# Patient Record
Sex: FEMALE | Race: WHITE | NOT HISPANIC OR LATINO | Employment: OTHER | URBAN - METROPOLITAN AREA
[De-identification: names, ages, dates, MRNs, and addresses within clinical notes are randomized per-mention and may not be internally consistent; named-entity substitution may affect disease eponyms.]

---

## 2017-09-29 ENCOUNTER — OFFICE VISIT (OUTPATIENT)
Dept: ENDOCRINOLOGY | Facility: CLINIC | Age: 37
End: 2017-09-29
Payer: COMMERCIAL

## 2017-09-29 VITALS
BODY MASS INDEX: 21.15 KG/M2 | SYSTOLIC BLOOD PRESSURE: 106 MMHG | WEIGHT: 131.6 LBS | HEIGHT: 66 IN | DIASTOLIC BLOOD PRESSURE: 70 MMHG | HEART RATE: 70 BPM

## 2017-09-29 DIAGNOSIS — D49.7 PITUITARY TUMOR: ICD-10-CM

## 2017-09-29 DIAGNOSIS — N64.3 GALACTORRHEA, BILATERAL: ICD-10-CM

## 2017-09-29 DIAGNOSIS — D35.2 PROLACTINOMA (H): Primary | ICD-10-CM

## 2017-09-29 LAB
ALBUMIN SERPL-MCNC: 4.2 G/DL (ref 3.4–5)
ALP SERPL-CCNC: 54 U/L (ref 40–150)
ALT SERPL W P-5'-P-CCNC: 21 U/L (ref 0–50)
ANION GAP SERPL CALCULATED.3IONS-SCNC: 5 MMOL/L (ref 3–14)
AST SERPL W P-5'-P-CCNC: 14 U/L (ref 0–45)
BILIRUB SERPL-MCNC: 1.3 MG/DL (ref 0.2–1.3)
BUN SERPL-MCNC: 9 MG/DL (ref 7–30)
CALCIUM SERPL-MCNC: 8.9 MG/DL (ref 8.5–10.1)
CHLORIDE SERPL-SCNC: 102 MMOL/L (ref 94–109)
CO2 SERPL-SCNC: 30 MMOL/L (ref 20–32)
CORTIS SERPL-MCNC: 6.2 UG/DL (ref 4–22)
CREAT SERPL-MCNC: 0.6 MG/DL (ref 0.52–1.04)
FSH SERPL-ACNC: 3 IU/L
GFR SERPL CREATININE-BSD FRML MDRD: >90 ML/MIN/1.7M2
GLUCOSE SERPL-MCNC: 94 MG/DL (ref 70–99)
LH SERPL-ACNC: 6.3 IU/L
POTASSIUM SERPL-SCNC: 4.2 MMOL/L (ref 3.4–5.3)
PROLACTIN SERPL-MCNC: 41 UG/L (ref 3–27)
PROT SERPL-MCNC: 7.6 G/DL (ref 6.8–8.8)
SODIUM SERPL-SCNC: 137 MMOL/L (ref 133–144)
T4 FREE SERPL-MCNC: 0.95 NG/DL (ref 0.76–1.46)
TSH SERPL DL<=0.005 MIU/L-ACNC: 1.05 MU/L (ref 0.4–4)

## 2017-09-29 PROCEDURE — 99205 OFFICE O/P NEW HI 60 MIN: CPT | Performed by: INTERNAL MEDICINE

## 2017-09-29 PROCEDURE — 82024 ASSAY OF ACTH: CPT | Performed by: INTERNAL MEDICINE

## 2017-09-29 PROCEDURE — 83002 ASSAY OF GONADOTROPIN (LH): CPT | Performed by: INTERNAL MEDICINE

## 2017-09-29 PROCEDURE — 83001 ASSAY OF GONADOTROPIN (FSH): CPT | Performed by: INTERNAL MEDICINE

## 2017-09-29 PROCEDURE — 84439 ASSAY OF FREE THYROXINE: CPT | Performed by: INTERNAL MEDICINE

## 2017-09-29 PROCEDURE — 84443 ASSAY THYROID STIM HORMONE: CPT | Performed by: INTERNAL MEDICINE

## 2017-09-29 PROCEDURE — 80053 COMPREHEN METABOLIC PANEL: CPT | Performed by: INTERNAL MEDICINE

## 2017-09-29 PROCEDURE — 82533 TOTAL CORTISOL: CPT | Performed by: INTERNAL MEDICINE

## 2017-09-29 PROCEDURE — 84305 ASSAY OF SOMATOMEDIN: CPT | Performed by: INTERNAL MEDICINE

## 2017-09-29 PROCEDURE — 36415 COLL VENOUS BLD VENIPUNCTURE: CPT | Performed by: INTERNAL MEDICINE

## 2017-09-29 PROCEDURE — 84146 ASSAY OF PROLACTIN: CPT | Performed by: INTERNAL MEDICINE

## 2017-09-29 NOTE — MR AVS SNAPSHOT
After Visit Summary   9/29/2017    Aleta Shankar    MRN: [id number]           Patient Information     Date Of Birth          1980        Visit Information        Provider Department      9/29/2017 11:00 AM Megan Lopez MD [facility]        Today's Diagnoses     Prolactinoma (H)    -  1    Pituitary tumor          Care Instructions    Please allow 7-10 business days for your lab results. You will be notified by phone, letter, or My Chart after the provider has reviewed them.  Thank you.     Please call [phone] to schedule MRI.              Follow-ups after your visit        Follow-up notes from your care team     Return in about 6 months (around 3/29/2018).      Your next 10 appointments already scheduled     Mar 23, 2018  4:00 PM CDT   Return Visit with Megan Lopez MD   [facility] ([facility])    [id number] [place] [id number] [phone]              Who to contact     If you have questions or need follow up information about today's clinic visit or your schedule please contact [facility] directly at [phone].  Normal or non-critical lab and imaging results will be communicated to you by MyChart, letter or phone within 4 business days after the clinic has received the results. If you do not hear from us within 7 days, please contact the clinic through MyChart or phone. If you have a critical or abnormal lab result, we will notify you by phone as soon as possible.  Submit refill requests through Rebyoo or call your pharmacy and they will forward the refill request to us. Please allow 3 business days for your refill to be completed.          Additional Information About Your Visit        Universal [employer] Information     Rebyoo is an electronic gateway that provides easy, online access to your medical records. With Rebyoo, you can request a clinic appointment, read your test results, renew a prescription  "or communicate with your care team.     To sign up for RealMatcht visit the website at [email or web address]   You will be asked to enter the access code listed below, as well as some personal information. Please follow the directions to create your username and password.     Your access code is: [email or web address]  Expires: 2017 11:52 AM     Your access code will  in 90 days. If you need help or a new code, please contact your [facility] or call [phone] for assistance.        Care EveryWhere ID     This is your Care EveryWhere ID. This could be used by other organizations to access your [place] medical records  [id number]        Your Vitals Were     Pulse Height BMI (Body Mass Index)             70 1.664 m (5' 5.5\") 21.57 kg/m2          Blood Pressure from Last 3 Encounters:   17 106/70    Weight from Last 3 Encounters:   17 59.7 kg (131 lb 9.6 oz)              Today, you had the following     No orders found for display       Primary Care Provider Office Phone # Fax #    [facility] [phone]       [address] [address]        Equal Access to Services     [facility]: Hadii aad ku hadasho Soomaali, waaxda luqadaha, qaybta kaalmada adeegyada, waxay idiin hayaan adeeg kharahamida hampton . So [facility] [phone].    ATENCIÓN: Si habla español, tiene a quintana disposición servicios gratuitos de asistencia lingüística. Llame al [phone].    We comply with applicable federal civil rights laws and [place] laws. We do not discriminate on the basis of race, color, national origin, age, disability sex, sexual orientation or gender identity.            Thank you!     Thank you for choosing [facility]  for your care. Our goal is always to provide you with excellent care. Hearing back from our patients is one way we can continue to improve our services. Please take a few minutes " to complete the written survey that you may receive in the mail after your visit with us. Thank you!             Your Updated Medication List - Protect others around you: Learn how to safely use, store and throw away your medicines at www.disposemymeds.org.      Notice  As of 9/29/2017 11:52 AM    You have not been prescribed any medications.

## 2017-09-29 NOTE — LETTER
"9/29/2017       RE: Aleta Shankar  [address] [facility] [address]     Dear Colleague,    Thank you for referring your patient, Aleta Shankar, to the [facility] at [facility]. Please see a copy of my visit note below.                                     - Endocrinology Initial Consultation -    Reason for visit/consult:  Prolactinoma    Primary care provider: Jacquelyn Marrufo    HPI: A 38 yo female here for the evaluation/transiton of care for her prolactinoma.   She was diagnosed prolactinoma around 15 years ago, the original diagnosis was made during her prolonged amenorrhea work up. She recalled her PRL was some 3 digits with small tumor by MRI (size unclear, we don't have record).  She was initially prescribed bromocriptine which she took around 2-3 years on and off without consistency. Then eventually she self discontinued for the next several years. Last imaging study was 2012 MRI, which reported as no signs of recurrent tumor (no original images obtained).     Around 2012, she was planning to conceive and went to endocrinologist and started on cabergoline, after a few months of cabergoline, she became pregnant and stopped cabergoline. Since then she has not had any dopamine agonists and she naturally conceived the second child in 2015.     Her main concern today is her recent blurry vision with HA, and galactorrhea. She describes her HA like \"sinus and and whole head, skull pressing like pain\", frequency every 2 weeks, then currently better. She is recently sensivie to noise and smell, no photo sensivitiy.  A few weeks ago, she started to noticed bilateral galactorrhea as well.       Normal mentrual cycle: buefoe baby was heavy cycles, once a motn    Energy level: lower side, hair loss: a little more thatn normal, hair drier, ,  Body weight: no change,     Of note, she had light headedness, dizziness, nauseous with both " "bromocriptine and cabergoline but more symptoms with bromocriptine.     Her family plan: not actively planning now but may consider in the future.    Past Medical/Surgical History:  Past Medical History:   Diagnosis Date     Prolactinoma (H)     since around      Past Surgical History:   Procedure Laterality Date      SECTION         Allergies:  No Known Allergies    Current Medications   No current outpatient prescriptions on file.     No current facility-administered medications for this visit.        Family History:  maternal aunt: lupus, maternal uncle: IBS, paternal aunt: DM  Maternal cousin: crohn disease, maternal aunt: ulcerative colitis    Social History:  Social History   Substance Use Topics     Smoking status: Never Smoker     Smokeless tobacco: Never Used     Alcohol use Not on file       ROS:  Full review of systems taken with the help of the intake sheet. Otherwise a complete 14 point review of systems was taken and is negative unless stated in the history above.    Physical Exam:   Blood pressure 106/70, pulse 70, height 1.664 m (5' 5.5\"), weight 59.7 kg (131 lb 9.6 oz).  General: well appearing, no acute distress, pleasant and conversant,   Mental Status/neuro: alert and oriented  Face: symmetrical, normal facial color  Facial skin: no acnes, no hirsutism  Eyes: pink-redness+, PERRL, no proptosis or lid lag  Visual field: grossly intact  Occular motor: full motion  Neck: suppler, no lymphadenopahty  Thyroid: normal size and texture, no nodule palpable, no bruits  Breast: bilateral galactorrhea++  eart: regular rhythm, S1S2, no murmur appreciated  Lung: clear to auscultation bilaterally  Abdomen: soft, NT/ND, no hepatomegaly  Legs: no swelling or edema      Labs :  I reviewed data from care everywhere (outside record) and extract and summarize pertinent data here.       Prolactin trends  2011   3/2012   2013   2014   3/2015   2015   2017  119.1        87.2       122.7     " 43.6      36.3         36.3       69.4    FSH 2.8 (4/2013)    TSH 2.36 (6/27/2017)    Am cortisol  5 (3/2012)    Lab Results   Component Value Date    CR 0.80 01/24/2008     No results found for: GLC  Lab Results   Component Value Date    TSH 1.84 01/24/2008     Lab Results   Component Value Date    T4 1.03 01/24/2008     Lab Results   Component Value Date    ESTROGEN 25 01/24/2008     Lab Results   Component Value Date    PROLACTIN 90 03/20/2008       MRI Brain: 3/9/2012 at [facility]   CLINICAL HISTORY:  Increased prolactin levels.    Comparison:  None.  FINDINGS:  There are no intracranial masses, midline shift, hydrocephalus,   intracranial hemorrhage or acute ischemia.  Basal cisterns and   intracranial ventricles show normal size and configuration.  There are no   signal abnormalities or structural abnormalities in the brain.  There is   no abnormal contrast enhancement throughout.    Dedicated imaging of the sella turcica reveals homogeneous signal and   contrast enhancement within the pituitary gland.  No evidence for mass or   microadenoma.    Assessment and Plan  37 year old female with long standing prolactinoma, without Crum, currently recurred symptoms of HA, galactorrhea,    - Will check PRL and other pituitary panels today  - Will set up pituitary MRI within this year  - Based on these information, we will discuss over the phone (or short visit) for the further plan  - RTC with me in 6 month      I spent 60 minutes with this patient face to face and explained the conditions and plans (more than 50% of time was counseling/coordination of care, plan of evaluation of her current status of prolactinoma) . The patient understood and is satisfied with today's visit. Return to clinic with me in 6 months.         Megan Lopez MD  Staff Physician  Endocrinology and Metabolism  License: [id number]    Again, thank you for allowing me to participate in the care of your patient.      Sincerely,    Megan Lopez MD

## 2017-09-29 NOTE — PATIENT INSTRUCTIONS
Please allow 7-10 business days for your lab results. You will be notified by phone, letter, or My Chart after the provider has reviewed them.  Thank you.     Please call [phone] to schedule MRI.

## 2017-09-29 NOTE — PROGRESS NOTES
"                                 - Endocrinology Initial Consultation -    Reason for visit/consult:  Prolactinoma    Primary care provider: Jacquelyn Marrufo    HPI: A 36 yo female here for the evaluation/transiton of care for her prolactinoma.   She was diagnosed prolactinoma around 15 years ago, the original diagnosis was made during her prolonged amenorrhea work up. She recalled her PRL was some 3 digits with small tumor by MRI (size unclear, we don't have record).  She was initially prescribed bromocriptine which she took around 2-3 years on and off without consistency. Then eventually she self discontinued for the next several years. Last imaging study was 2012 MRI, which reported as no signs of recurrent tumor (no original images obtained).     Around 2012, she was planning to conceive and went to endocrinologist and started on cabergoline, after a few months of cabergoline, she became pregnant and stopped cabergoline. Since then she has not had any dopamine agonists and she naturally conceived the second child in 2015.     Her main concern today is her recent blurry vision with HA, and galactorrhea. She describes her HA like \"sinus and and whole head, skull pressing like pain\", frequency every 2 weeks, then currently better. She is recently sensivie to noise and smell, no photo sensivitiy.  A few weeks ago, she started to noticed bilateral galactorrhea as well.       Normal mentrual cycle: buefoe baby was heavy cycles, once a motn    Energy level: lower side, hair loss: a little more thatn normal, hair drier, ,  Body weight: no change,     Of note, she had light headedness, dizziness, nauseous with both bromocriptine and cabergoline but more symptoms with bromocriptine.     Her family plan: not actively planning now but may consider in the future.    Past Medical/Surgical History:  Past Medical History:   Diagnosis Date     Prolactinoma (H)     since around 2002     Past Surgical History: " "  Procedure Laterality Date      SECTION         Allergies:  No Known Allergies    Current Medications   No current outpatient prescriptions on file.     No current facility-administered medications for this visit.        Family History:  maternal aunt: lupus, maternal uncle: IBS, paternal aunt: DM  Maternal cousin: crohn disease, maternal aunt: ulcerative colitis    Social History:  Social History   Substance Use Topics     Smoking status: Never Smoker     Smokeless tobacco: Never Used     Alcohol use Not on file       ROS:  Full review of systems taken with the help of the intake sheet. Otherwise a complete 14 point review of systems was taken and is negative unless stated in the history above.    Physical Exam:   Blood pressure 106/70, pulse 70, height 1.664 m (5' 5.5\"), weight 59.7 kg (131 lb 9.6 oz).  General: well appearing, no acute distress, pleasant and conversant,   Mental Status/neuro: alert and oriented  Face: symmetrical, normal facial color  Facial skin: no acnes, no hirsutism  Eyes: pink-redness+, PERRL, no proptosis or lid lag  Visual field: grossly intact  Occular motor: full motion  Neck: suppler, no lymphadenopahty  Thyroid: normal size and texture, no nodule palpable, no bruits  Breast: bilateral galactorrhea++  eart: regular rhythm, S1S2, no murmur appreciated  Lung: clear to auscultation bilaterally  Abdomen: soft, NT/ND, no hepatomegaly  Legs: no swelling or edema      Labs :  I reviewed data from care everywhere (outside record) and extract and summarize pertinent data here.       Prolactin trends  2011   3/2012   2013   2014   3/2015   2015   2017  119.1        87.2       122.7     43.6      36.3         36.3       69.4    FSH 2.8 (2013)    TSH 2.36 (2017)    Am cortisol  5 (3/2012)    Lab Results   Component Value Date    CR 0.80 2008     No results found for: GLC  Lab Results   Component Value Date    TSH 1.84 2008     Lab Results   Component Value " Date    T4 1.03 01/24/2008     Lab Results   Component Value Date    ESTROGEN 25 01/24/2008     Lab Results   Component Value Date    PROLACTIN 90 03/20/2008       MRI Brain: 3/9/2012 at [facility]   CLINICAL HISTORY:  Increased prolactin levels.    Comparison:  None.  FINDINGS:  There are no intracranial masses, midline shift, hydrocephalus,   intracranial hemorrhage or acute ischemia.  Basal cisterns and   intracranial ventricles show normal size and configuration.  There are no   signal abnormalities or structural abnormalities in the brain.  There is   no abnormal contrast enhancement throughout.    Dedicated imaging of the sella turcica reveals homogeneous signal and   contrast enhancement within the pituitary gland.  No evidence for mass or   microadenoma.    Assessment and Plan  37 year old female with long standing prolactinoma, without Crum, currently recurred symptoms of HA, galactorrhea,    - Will check PRL and other pituitary panels today  - Will set up pituitary MRI within this year  - Based on these information, we will discuss over the phone (or short visit) for the further plan  - RTC with me in 6 month      I spent 60 minutes with this patient face to face and explained the conditions and plans (more than 50% of time was counseling/coordination of care, plan of evaluation of her current status of prolactinoma) . The patient understood and is satisfied with today's visit. Return to clinic with me in 6 months.         Megan Lopez MD  Staff Physician  Endocrinology and Metabolism  License: [id number]

## 2017-09-29 NOTE — NURSING NOTE
"Aleta Shankar's goals for this visit include:   Chief Complaint   Patient presents with     Consult     elevated prolactin       She requests these members of her care team be copied on today's visit information: Jacquelyn Marrufo      PCP: Jacquelyn Marrufo    Referring Provider:  No referring provider defined for this encounter.    Chief Complaint   Patient presents with     Consult     elevated prolactin       Initial /70  Pulse 70  Ht 1.664 m (5' 5.5\")  Wt 59.7 kg (131 lb 9.6 oz)  BMI 21.57 kg/m2 Estimated body mass index is 21.57 kg/(m^2) as calculated from the following:    Height as of this encounter: 1.664 m (5' 5.5\").    Weight as of this encounter: 59.7 kg (131 lb 9.6 oz).  Medication Reconciliation: complete    Do you need any medication refills at today's visit? No    Nicky Weber LPN      "

## 2017-10-02 LAB — ACTH PLAS-MCNC: 19 PG/ML

## 2017-10-03 LAB — IGF-I BLD-MCNC: 287 NG/ML (ref 80–277)

## 2017-10-11 ENCOUNTER — TELEPHONE (OUTPATIENT)
Dept: ENDOCRINOLOGY | Facility: CLINIC | Age: 37
End: 2017-10-11

## 2017-10-11 ENCOUNTER — RADIANT APPOINTMENT (OUTPATIENT)
Dept: MRI IMAGING | Facility: CLINIC | Age: 37
End: 2017-10-11
Attending: INTERNAL MEDICINE
Payer: COMMERCIAL

## 2017-10-11 DIAGNOSIS — D35.2 PROLACTINOMA (H): Primary | ICD-10-CM

## 2017-10-11 DIAGNOSIS — D49.7 PITUITARY TUMOR: ICD-10-CM

## 2017-10-11 PROCEDURE — A9585 GADOBUTROL INJECTION: HCPCS | Performed by: INTERNAL MEDICINE

## 2017-10-11 PROCEDURE — 70553 MRI BRAIN STEM W/O & W/DYE: CPT | Performed by: RADIOLOGY

## 2017-10-11 RX ORDER — GADOBUTROL 604.72 MG/ML
7.5 INJECTION INTRAVENOUS ONCE
Status: COMPLETED | OUTPATIENT
Start: 2017-10-11 | End: 2017-10-11

## 2017-10-11 RX ADMIN — GADOBUTROL 6 ML: 604.72 INJECTION INTRAVENOUS at 15:52

## 2017-10-11 NOTE — TELEPHONE ENCOUNTER
Patient advised. Patient verbalizes understanding and agrees to plan. Patient is having MRI today at 3:30pm.    Patient will call to schedule 1 month labs.      Caryn Levi RN  Endocrine Care Coordinator  [facility]

## 2017-10-11 NOTE — TELEPHONE ENCOUNTER
Left voicemail for patient to contact our office.     Caryn Levi RN  Endocrine Care Coordinator  [facility]

## 2017-10-12 ENCOUNTER — DOCUMENTATION ONLY (OUTPATIENT)
Dept: ENDOCRINOLOGY | Facility: CLINIC | Age: 37
End: 2017-10-12

## 2017-10-12 NOTE — PROGRESS NOTES
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 Will repeat next month PRL and IGF1.    I explained to the patient.    Megan Lopez MD  Staff Physician  Endocrinology and Metabolism  [facility] [employer]  License: [id number]  Pager: [phone]

## 2017-10-29 ENCOUNTER — HEALTH MAINTENANCE LETTER (OUTPATIENT)
Age: 37
End: 2017-10-29

## 2017-10-31 DIAGNOSIS — D35.2 PROLACTINOMA (H): ICD-10-CM

## 2017-10-31 PROCEDURE — 84146 ASSAY OF PROLACTIN: CPT | Performed by: INTERNAL MEDICINE

## 2017-10-31 PROCEDURE — 84305 ASSAY OF SOMATOMEDIN: CPT | Performed by: INTERNAL MEDICINE

## 2017-10-31 PROCEDURE — 36415 COLL VENOUS BLD VENIPUNCTURE: CPT | Performed by: INTERNAL MEDICINE

## 2017-11-01 LAB — PROLACTIN SERPL-MCNC: 44 UG/L (ref 3–27)

## 2017-11-03 LAB — IGF-I BLD-MCNC: 240 NG/ML (ref 80–277)

## 2017-11-16 ENCOUNTER — TELEPHONE (OUTPATIENT)
Dept: ENDOCRINOLOGY | Facility: CLINIC | Age: 37
End: 2017-11-16

## 2017-11-17 ENCOUNTER — TELEPHONE (OUTPATIENT)
Dept: ENDOCRINOLOGY | Facility: CLINIC | Age: 37
End: 2017-11-17

## 2017-11-17 NOTE — TELEPHONE ENCOUNTER
Patient received message from provider. Patient does not want to start cabergoline unless provider strongly feels it's necessary.    Will forward update to Dr. Lopez.    Luz Whitman LPN  Adult Endocrinology  [facility]

## 2017-11-17 NOTE — TELEPHONE ENCOUNTER
Aleta returned Dr. Lopez's call.  She will wait for a return call.  Best contact [phone]. Ok to leave a detailed message if necessary.  Thank you.

## 2017-11-17 NOTE — TELEPHONE ENCOUNTER
I called and left message for the results, at this point, there is no definite indication for starting DA.  Galactorrhea+, but nomal menses. 4 mm tumor    Megan Lopez MD  Staff Physician  Endocrinology and Metabolism  [facility]  License: [id number]  Pager: [phone]

## 2018-03-23 ENCOUNTER — OFFICE VISIT (OUTPATIENT)
Dept: ENDOCRINOLOGY | Facility: CLINIC | Age: 38
End: 2018-03-23
Payer: COMMERCIAL

## 2018-03-23 VITALS
BODY MASS INDEX: 21.83 KG/M2 | DIASTOLIC BLOOD PRESSURE: 67 MMHG | HEART RATE: 68 BPM | HEIGHT: 66 IN | SYSTOLIC BLOOD PRESSURE: 106 MMHG | WEIGHT: 135.8 LBS | OXYGEN SATURATION: 98 %

## 2018-03-23 DIAGNOSIS — D35.2 PROLACTINOMA (H): Primary | ICD-10-CM

## 2018-03-23 DIAGNOSIS — R79.89 ELEVATED INSULIN-LIKE GROWTH FACTOR 1 (IGF-1) LEVEL: ICD-10-CM

## 2018-03-23 DIAGNOSIS — N64.3 GALACTORRHEA, BILATERAL: ICD-10-CM

## 2018-03-23 DIAGNOSIS — D49.7 PITUITARY TUMOR: ICD-10-CM

## 2018-03-23 LAB
PROLACTIN SERPL-MCNC: 39 UG/L (ref 3–27)
T4 FREE SERPL-MCNC: 0.94 NG/DL (ref 0.76–1.46)
TSH SERPL DL<=0.005 MIU/L-ACNC: 1.22 MU/L (ref 0.4–4)

## 2018-03-23 PROCEDURE — 84439 ASSAY OF FREE THYROXINE: CPT | Performed by: INTERNAL MEDICINE

## 2018-03-23 PROCEDURE — 84305 ASSAY OF SOMATOMEDIN: CPT | Performed by: INTERNAL MEDICINE

## 2018-03-23 PROCEDURE — 36415 COLL VENOUS BLD VENIPUNCTURE: CPT | Performed by: INTERNAL MEDICINE

## 2018-03-23 PROCEDURE — 84443 ASSAY THYROID STIM HORMONE: CPT | Performed by: INTERNAL MEDICINE

## 2018-03-23 PROCEDURE — 99214 OFFICE O/P EST MOD 30 MIN: CPT | Performed by: INTERNAL MEDICINE

## 2018-03-23 PROCEDURE — 84146 ASSAY OF PROLACTIN: CPT | Performed by: INTERNAL MEDICINE

## 2018-03-23 NOTE — PROGRESS NOTES
"                                 - Endocrinology follow up -    Reason for visit/consult:  Micro Prolactinoma    Primary care provider: Jacquelyn Marrufo    HPI: A 36 yo female here for the evaluation/transiton of care for her prolactinoma.   She was diagnosed prolactinoma around 15 years ago, the original diagnosis was made during her prolonged amenorrhea work up. She recalled her PRL was some 3 digits with small tumor by MRI (size unclear, we don't have record).  She was initially prescribed bromocriptine which she took around 2-3 years on and off without consistency. Then eventually she self discontinued for the next several years. Last imaging study was 2012 MRI, which reported as no signs of recurrent tumor (no original images obtained).     Around 2012, she was planning to conceive and went to endocrinologist and started on cabergoline, after a few months of cabergoline, she became pregnant and stopped cabergoline. Since then she has not had any dopamine agonists and she naturally conceived the second child in 2015.     Her main concern today is her recent blurry vision with HA, and galactorrhea. She describes her HA like \"sinus and and whole head, skull pressing like pain\", frequency every 2 weeks, then currently better. She is recently sensivie to noise and smell, no photo sensivitiy.  A few weeks ago, she started to noticed bilateral galactorrhea as well.     Energy level: lower side, hair loss: a little more thatn normal, hair drier, ,  Body weight: no change, Of note, she had light headedness, dizziness, nauseous with both bromocriptine and cabergoline but more symptoms with bromocriptine.   Her family plan: not actively planning now but may consider in the future.    (intermittent history) Her PRL level has been stable range from 41-44.  She underwent pituitary MRI in November 2017 showed 4 mm micro adenoma on the left.  Still has mild galactorrhea, but not disturbing her daily life.  " "Menstrual cycle used to be 30 days but now 28 days and irregular.  For the past 1 or 2 months her headache disappeared as well.  She has not taking any dopamine agonist.         Past Medical/Surgical History:  Past Medical History:   Diagnosis Date     Prolactinoma (H)     since around      Past Surgical History:   Procedure Laterality Date      SECTION         Allergies:  No Known Allergies    Current Medications   No current outpatient prescriptions on file.     No current facility-administered medications for this visit.      Facility-Administered Medications Ordered in Other Visits   Medication     diatrizoate meglumine (CYSTOGRAFIN) solution 300 mL       Family History:  maternal aunt: lupus, maternal uncle: IBS, paternal aunt: DM  Maternal cousin: crohn disease, maternal aunt: ulcerative colitis    Social History:  Social History   Substance Use Topics     Smoking status: Never Smoker     Smokeless tobacco: Never Used     Alcohol use Not on file       ROS:  Full review of systems taken with the help of the intake sheet. Otherwise a complete 14 point review of systems was taken and is negative unless stated in the history above.    Physical Exam:   Blood pressure 106/67, pulse 68, height 1.664 m (5' 5.5\"), weight 61.6 kg (135 lb 12.9 oz), last menstrual period 2018, SpO2 98 %.     ENDO VITALS-[facility] 3/23/2018 2017   Weight 135 lb 12.9 oz 131 lb 9.6 oz     General: well appearing, no acute distress, pleasant and conversant,   Mental Status/neuro: alert and oriented  Face: symmetrical, normal facial color, no acnes  Facial skin: no acnes, no hirsutism  Eyes: pink-redness+, PERRL, no proptosis or lid lag  Visual field: grossly intact  Occular motor: full motion  Neck: suppler, no lymphadenopahty  Thyroid: normal size and texture, no nodule palpable, no bruits  Heart: regular rhythm, S1S2, no murmur appreciated  Lung: clear to auscultation bilaterally  Abdomen: soft, NT/ND, no " hepatomegaly  Legs: no swelling or edema      Labs :  I reviewed data from care everywhere (outside record) as well as from epic and extract and summarize pertinent data here.       Prolactin trends  11/2011   3/2012   4/2013   8/2014   3/2015   8/2015   6/27/2017  119.1        87.2       122.7     43.6      36.3         36.3       69.4         1/24/2008 10:39 3/20/2008 09:48 9/29/2017 11:53 10/31/2017 12:56   Prolactin 136 (H) 90 (H) 41 (H) 44 (H)   TSH 1.84  1.05    T4 Free 1.03  0.95    Ins Growth Factor 1   287 (H) 240     FSH 2.8 (4/2013)    TSH 2.36 (6/27/2017)    Am cortisol  5 (3/2012)    Lab Results   Component Value Date    CR 0.80 01/24/2008     No results found for: GLC  Lab Results   Component Value Date    TSH 1.84 01/24/2008     Lab Results   Component Value Date    T4 1.03 01/24/2008     Lab Results   Component Value Date    ESTROGEN 25 01/24/2008     Lab Results   Component Value Date    PROLACTIN 90 03/20/2008       MRI Brain: 3/9/2012 [facility]   CLINICAL HISTORY:  Increased prolactin levels.    Comparison:  None.  FINDINGS:  There are no intracranial masses, midline shift, hydrocephalus,   intracranial hemorrhage or acute ischemia.  Basal cisterns and   intracranial ventricles show normal size and configuration.  There are no   signal abnormalities or structural abnormalities in the brain.  There is   no abnormal contrast enhancement throughout.    Dedicated imaging of the sella turcica reveals homogeneous signal and   contrast enhancement within the pituitary gland.  No evidence for mass or   microadenoma.      MRI pituitary: 11/2017--I personally reviewed the original images and also went over the imaging with the patient and explained.  Agree below somebody of 4 mm left microadenoma.  Optic chiasm intact.      Impression: 4 mm left posterior adenohypophysis microadenoma without  mass effect. Will repeat next month PRL and IGF1.     I explained to the patient.               Assessment and  Plan  37 year old female with long standing prolactinoma, without Crum, currently recurred symptoms of HA, galactorrhea,    # Prolactinoma  - PRL, TSH, free T4 today  - No need dopamine agonist therapy since she is not planning fertility, galactorrhea mild not bothering her daily life, MARCUS improved.     # Mildly elevated IGF1 previously  - No signs of acromegaly, repeated one was normal range,   - IGF1 today as well    # Galactorrhea  - Due to Prolactinoma, but not bothering her daily life, will watch      - RTC with me in 1 year      I spent 35 minutes with this patient face to face and explained the conditions and plans (more than 50% of time was counseling/coordination of care, plan of observation of her current status of prolactinoma) . The patient understood and is satisfied with today's visit. Return to clinic with me in 1 year.         Megan Lopez MD  Staff Physician  Endocrinology and Metabolism  License: [id number]

## 2018-03-23 NOTE — LETTER
"    3/23/2018         RE: Aleta Shankar  [address] [place] [place] [address]        Dear Colleague,    Thank you for referring your patient, Aleta Shankar, to the [facility]. Please see a copy of my visit note below.                                     - Endocrinology follow up -    Reason for visit/consult:  Micro Prolactinoma    Primary care provider: Jacquelyn Marrufo    HPI: A 36 yo female here for the evaluation/transiton of care for her prolactinoma.   She was diagnosed prolactinoma around 15 years ago, the original diagnosis was made during her prolonged amenorrhea work up. She recalled her PRL was some 3 digits with small tumor by MRI (size unclear, we don't have record).  She was initially prescribed bromocriptine which she took around 2-3 years on and off without consistency. Then eventually she self discontinued for the next several years. Last imaging study was 2012 MRI, which reported as no signs of recurrent tumor (no original images obtained).     Around 2012, she was planning to conceive and went to endocrinologist and started on cabergoline, after a few months of cabergoline, she became pregnant and stopped cabergoline. Since then she has not had any dopamine agonists and she naturally conceived the second child in 2015.     Her main concern today is her recent blurry vision with HA, and galactorrhea. She describes her HA like \"sinus and and whole head, skull pressing like pain\", frequency every 2 weeks, then currently better. She is recently sensivie to noise and smell, no photo sensivitiy.  A few weeks ago, she started to noticed bilateral galactorrhea as well.     Energy level: lower side, hair loss: a little more thatn normal, hair drier, ,  Body weight: no change, Of note, she had light headedness, dizziness, nauseous with both bromocriptine and cabergoline but more symptoms with bromocriptine.   Her family plan: not actively planning now but may consider " "in the future.    (intermittent history) Her PRL level has been stable range from 41-44.  She underwent pituitary MRI in 2017 showed 4 mm micro adenoma on the left.  Still has mild galactorrhea, but not disturbing her daily life.  Menstrual cycle used to be 30 days but now 28 days and irregular.  For the past 1 or 2 months her headache disappeared as well.  She has not taking any dopamine agonist.         Past Medical/Surgical History:  Past Medical History:   Diagnosis Date     Prolactinoma (H)     since around      Past Surgical History:   Procedure Laterality Date      SECTION         Allergies:  No Known Allergies    Current Medications   No current outpatient prescriptions on file.     No current facility-administered medications for this visit.      Facility-Administered Medications Ordered in Other Visits   Medication     diatrizoate meglumine (CYSTOGRAFIN) solution 300 mL       Family History:  maternal aunt: lupus, maternal uncle: IBS, paternal aunt: DM  Maternal cousin: crohn disease, maternal aunt: ulcerative colitis    Social History:  Social History   Substance Use Topics     Smoking status: Never Smoker     Smokeless tobacco: Never Used     Alcohol use Not on file       ROS:  Full review of systems taken with the help of the intake sheet. Otherwise a complete 14 point review of systems was taken and is negative unless stated in the history above.    Physical Exam:   Blood pressure 106/67, pulse 68, height 1.664 m (5' 5.5\"), weight 61.6 kg (135 lb 12.9 oz), last menstrual period 2018, SpO2 98 %.     ENDO VITALS-[facility] 3/23/2018 2017   Weight 135 lb 12.9 oz 131 lb 9.6 oz     General: well appearing, no acute distress, pleasant and conversant,   Mental Status/neuro: alert and oriented  Face: symmetrical, normal facial color, no acnes  Facial skin: no acnes, no hirsutism  Eyes: pink-redness+, PERRL, no proptosis or lid lag  Visual field: grossly intact  Occular motor: full " motion  Neck: suppler, no lymphadenopahty  Thyroid: normal size and texture, no nodule palpable, no bruits  Heart: regular rhythm, S1S2, no murmur appreciated  Lung: clear to auscultation bilaterally  Abdomen: soft, NT/ND, no hepatomegaly  Legs: no swelling or edema      Labs :  I reviewed data from care everywhere (outside record) as well as from epic and extract and summarize pertinent data here.       Prolactin trends  11/2011   3/2012   4/2013   8/2014   3/2015   8/2015   6/27/2017  119.1        87.2       122.7     43.6      36.3         36.3       69.4         1/24/2008 10:39 3/20/2008 09:48 9/29/2017 11:53 10/31/2017 12:56   Prolactin 136 (H) 90 (H) 41 (H) 44 (H)   TSH 1.84  1.05    T4 Free 1.03  0.95    Ins Growth Factor 1   287 (H) 240     FSH 2.8 (4/2013)    TSH 2.36 (6/27/2017)    Am cortisol  5 (3/2012)    Lab Results   Component Value Date    CR 0.80 01/24/2008     No results found for: GLC  Lab Results   Component Value Date    TSH 1.84 01/24/2008     Lab Results   Component Value Date    T4 1.03 01/24/2008     Lab Results   Component Value Date    ESTROGEN 25 01/24/2008     Lab Results   Component Value Date    PROLACTIN 90 03/20/2008       MRI Brain: 3/9/2012 at [facility]   CLINICAL HISTORY:  Increased prolactin levels.    Comparison:  None.  FINDINGS:  There are no intracranial masses, midline shift, hydrocephalus,   intracranial hemorrhage or acute ischemia.  Basal cisterns and   intracranial ventricles show normal size and configuration.  There are no   signal abnormalities or structural abnormalities in the brain.  There is   no abnormal contrast enhancement throughout.    Dedicated imaging of the sella turcica reveals homogeneous signal and   contrast enhancement within the pituitary gland.  No evidence for mass or   microadenoma.      MRI pituitary: 11/2017--I personally reviewed the original images and also went over the imaging with the patient and explained.  Agree below somebody of 4 mm left  microadenoma.  Optic chiasm intact.      Impression: 4 mm left posterior adenohypophysis microadenoma without  mass effect. Will repeat next month PRL and IGF1.     I explained to the patient.               Assessment and Plan  37 year old female with long standing prolactinoma, without Crum, currently recurred symptoms of HA, galactorrhea,    # Prolactinoma  - PRL, TSH, free T4 today  - No need dopamine agonist therapy since she is not planning fertility, galactorrhea mild not bothering her daily life, MARCUS improved.     # Mildly elevated IGF1 previously  - No signs of acromegaly, repeated one was normal range,   - IGF1 today as well    # Galactorrhea  - Due to Prolactinoma, but not bothering her daily life, will watch      - RTC with me in 1 year      I spent 35 minutes with this patient face to face and explained the conditions and plans (more than 50% of time was counseling/coordination of care, plan of observation of her current status of prolactinoma) . The patient understood and is satisfied with today's visit. Return to clinic with me in 1 year.         Megan Lopez MD  Staff Physician  Endocrinology and Metabolism  License: [id number]    Again, thank you for allowing me to participate in the care of your patient.        Sincerely,        Megan Lopez MD

## 2018-03-23 NOTE — LETTER
Patient:  Aleta Shankar  :   1980  MRN:     [id number]        Ms.Marie Shankar  [address] [address] [place] [id number]        2018    Dear ,    We are writing to inform you of your test results. Prolactin level slightly better. Other hormones look good.   Continue the plan we discussed.    Take care    Megan Lopez MD      Resulted Orders   Prolactin   Result Value Ref Range    Prolactin 39 (H) 3 - 27 ug/L      Comment:      Reference ranges apply to non-pregnant females only.   Insulin Growth Factor 1 by Immunoassay   Result Value Ref Range    Ins Growth Factor 1 269 80 - 277 ng/ml   TSH   Result Value Ref Range    TSH 1.22 0.40 - 4.00 mU/L   T4 free   Result Value Ref Range    T4 Free 0.94 0.76 - 1.46 ng/dL       Megan Lopez MD

## 2018-03-23 NOTE — NURSING NOTE
"Aleta Raad's goals for this visit include: Follow up Prolactinoma  She requests these members of her care team be copied on today's visit information: YES    PCP: Jacquelyn Marrufo    Referring Provider:  No referring provider defined for this encounter.    Chief Complaint   Patient presents with     RECHECK     Prolactinoma       Initial Ht 1.664 m (5' 5.5\")  Wt 61.6 kg (135 lb 12.9 oz)  LMP 02/25/2018  BMI 22.26 kg/m2 Estimated body mass index is 22.26 kg/(m^2) as calculated from the following:    Height as of this encounter: 1.664 m (5' 5.5\").    Weight as of this encounter: 61.6 kg (135 lb 12.9 oz).  Medication Reconciliation: complete    Do you need any medication refills at today's visit? NO    "

## 2018-03-23 NOTE — MR AVS SNAPSHOT
After Visit Summary   3/23/2018    Aleta Shankar    MRN: [id number]           Patient Information     Date Of Birth          1980        Visit Information        Provider Department      3/23/2018 4:00 PM Megan Lopez MD [facility]        Today's Diagnoses     Prolactinoma (H)    -  1    Pituitary tumor        Galactorrhea, bilateral        Elevated insulin-like growth factor 1 (IGF-1) level           Follow-ups after your visit        Follow-up notes from your care team     Return in about 1 year (around 3/23/2019).      Who to contact     If you have questions or need follow up information about today's clinic visit or your schedule please contact [facility] directly at [phone].  Normal or non-critical lab and imaging results will be communicated to you by [employer], letter or phone within 4 business days after the clinic has received the results. If you do not hear from us within 7 days, please contact the clinic through [employer] or phone. If you have a critical or abnormal lab result, we will notify you by phone as soon as possible.  Submit refill requests through [employer] or call your pharmacy and they will forward the refill request to us. Please allow 3 business days for your refill to be completed.          Additional Information About Your Visit        MyChart Information     [employer] gives you secure access to your electronic health record. If you see a primary care provider, you can also send messages to your care team and make appointments. If you have questions, please call your primary care clinic.  If you do not have a primary care provider, please call [phone] and they will assist you.      [employer] is an electronic gateway that provides easy, online access to your medical records. With [employer], you can request a clinic appointment, read your test results, renew a prescription or communicate with your care team.     To access your existing  "account, please contact your [facility] or call [phone] for assistance.        Care EveryWhere ID     This is your Care EveryWhere ID. This could be used by other organizations to access your [place] medical records  [id number]        Your Vitals Were     Pulse Height Last Period Pulse Oximetry BMI (Body Mass Index)       68 1.664 m (5' 5.5\") 02/25/2018 98% 22.26 kg/m2        Blood Pressure from Last 3 Encounters:   03/23/18 106/67   09/29/17 106/70    Weight from Last 3 Encounters:   03/23/18 61.6 kg (135 lb 12.9 oz)   09/29/17 59.7 kg (131 lb 9.6 oz)              We Performed the Following     Insulin Growth Factor 1 by Immunoassay     Prolactin     T4 free     TSH        Primary Care Provider Office Phone # Fax #    Jacquelyn SheldonAlicea [phone]       [facility] [address] [address]        Equal Access to Services     EFRA HINES : Hadii aad ku hadasho Soomaali, waaxda luqadaha, qaybta kaalmada adeegyada, waxay idiin haytimmyn diana hampton . So [facility] [phone].    ATENCIÓN: Si habla español, tiene a quintana disposición servicios gratuitos de asistencia lingüística. [facility] [phone].    We comply with applicable federal civil rights laws and [place] laws. We do not discriminate on the basis of race, color, national origin, age, disability, sex, sexual orientation, or gender identity.            Thank you!     Thank you for choosing [facility]  for your care. Our goal is always to provide you with excellent care. Hearing back from our patients is one way we can continue to improve our services. Please take a few minutes to complete the written survey that you may receive in the mail after your visit with us. Thank you!             Your Updated Medication List - Protect others around you: Learn how to safely use, store and throw away your medicines at www.disposemymeds.org.      Notice  As of " 3/23/2018  5:34 PM    You have not been prescribed any medications.

## 2018-03-27 LAB — IGF-I BLD-MCNC: 269 NG/ML (ref 80–277)

## 2019-03-15 ENCOUNTER — OFFICE VISIT (OUTPATIENT)
Dept: ENDOCRINOLOGY | Facility: CLINIC | Age: 39
End: 2019-03-15
Payer: COMMERCIAL

## 2019-03-15 VITALS
DIASTOLIC BLOOD PRESSURE: 70 MMHG | HEART RATE: 64 BPM | BODY MASS INDEX: 21.44 KG/M2 | HEIGHT: 66 IN | WEIGHT: 133.38 LBS | SYSTOLIC BLOOD PRESSURE: 116 MMHG | OXYGEN SATURATION: 99 %

## 2019-03-15 DIAGNOSIS — D49.7 PITUITARY TUMOR: ICD-10-CM

## 2019-03-15 DIAGNOSIS — D35.2 PROLACTINOMA (H): Primary | ICD-10-CM

## 2019-03-15 DIAGNOSIS — N64.3 GALACTORRHEA: ICD-10-CM

## 2019-03-15 LAB
PROLACTIN SERPL-MCNC: 53 UG/L (ref 3–27)
T4 FREE SERPL-MCNC: 0.92 NG/DL (ref 0.76–1.46)
TSH SERPL DL<=0.005 MIU/L-ACNC: 2.07 MU/L (ref 0.4–4)

## 2019-03-15 PROCEDURE — 36415 COLL VENOUS BLD VENIPUNCTURE: CPT | Performed by: INTERNAL MEDICINE

## 2019-03-15 PROCEDURE — 99214 OFFICE O/P EST MOD 30 MIN: CPT | Performed by: INTERNAL MEDICINE

## 2019-03-15 PROCEDURE — 84305 ASSAY OF SOMATOMEDIN: CPT | Performed by: INTERNAL MEDICINE

## 2019-03-15 PROCEDURE — 84146 ASSAY OF PROLACTIN: CPT | Performed by: INTERNAL MEDICINE

## 2019-03-15 PROCEDURE — 84443 ASSAY THYROID STIM HORMONE: CPT | Performed by: INTERNAL MEDICINE

## 2019-03-15 PROCEDURE — 84439 ASSAY OF FREE THYROXINE: CPT | Performed by: INTERNAL MEDICINE

## 2019-03-15 ASSESSMENT — MIFFLIN-ST. JEOR: SCORE: 1301.5

## 2019-03-15 NOTE — NURSING NOTE
"Aleta Shankar's goals for this visit include:   Chief Complaint   Patient presents with     RECHECK     1 year pituitary follow up       She requests these members of her care team be copied on today's visit information: Yes    PCP: Jacquelyn Marrufo    Referring Provider:  No referring provider defined for this encounter.    /70 (BP Location: Left arm, Patient Position: Chair, Cuff Size: Adult Regular)   Pulse 64   Ht 1.668 m (5' 5.67\")   Wt 60.5 kg (133 lb 6.1 oz)   SpO2 99%   BMI 21.75 kg/m      Do you need any medication refills at today's visit? No    "

## 2019-03-15 NOTE — LETTER
3/15/2019         RE: Aleta Shankar  [address] [facility] [address]        Dear Colleague,    Thank you for referring your patient, Aleta Shankar, to the [facility]. Please see a copy of my visit note below.                                     - Endocrinology follow up -    Reason for visit/consult:  Micro Prolactinoma    Primary care provider: Jacquelyn Marrufo      Assessment and Plan  A 38 year old female with long standing prolactinoma, without Crum, currently recurred symptoms of HA, galactorrhea,    # Prolactinoma  - PRL, TSH, free T4 today  Patient does not want on dopamine agonist treatment, and she does not have definite indications as well,  PRL mild elevation with stable levels, she is not planning fertility, mild galactorrhea not bother her QI, and also menstrual cycle regular.     # Mildly elevated IGF1 previously  - No signs of acromegaly, repeated one was normal range,   - IGF1 today as well    # Galactorrhea  - Due to Prolactinoma, but not bothering her daily life, will watch    # Pituitary tumor  4 mm on the left part of the sella in 10/2017. Will do 3 year follow up MRI in 2020.     # dry skin/constipation  We will do screening of TSH and free T4 today       - RTC with me in 1 year       I spent 30 minutes with this patient face to face and explained the conditions and plans (more than 50% of time was counseling/coordination of care, plan of observation of her current status of prolactinoma) . The patient understood and is satisfied with today's visit. Return to clinic with me in 1 year.         Megan Lopez MD  Staff Physician  Endocrinology and Metabolism  License: [id number]    Interval History: Patient has been doing well. Stable body weight, no HA, no blurry vision. Still has mild galactorrhea, but menstrual cycles regular. She also mentioned dry skin and constipations.   HPI: A 36 yo female here for the evaluation/transiton of care for her prolactinoma.  "  She was diagnosed prolactinoma around 15 years ago, the original diagnosis was made during her prolonged amenorrhea work up. She recalled her PRL was some 3 digits with small tumor by MRI (size unclear, we don't have record).  She was initially prescribed bromocriptine which she took around 2-3 years on and off without consistency. Then eventually she self discontinued for the next several years. Last imaging study was 2012 MRI, which reported as no signs of recurrent tumor (no original images obtained).     Around 2012, she was planning to conceive and went to endocrinologist and started on cabergoline, after a few months of cabergoline, she became pregnant and stopped cabergoline. Since then she has not had any dopamine agonists and she naturally conceived the second child in 2015.     Her main concern today is her recent blurry vision with HA, and galactorrhea. She describes her HA like \"sinus and and whole head, skull pressing like pain\", frequency every 2 weeks, then currently better. She is recently sensivie to noise and smell, no photo sensivitiy.  A few weeks ago, she started to noticed bilateral galactorrhea as well.     Energy level: lower side, hair loss: a little more thatn normal, hair drier, ,  Body weight: no change, Of note, she had light headedness, dizziness, nauseous with both bromocriptine and cabergoline but more symptoms with bromocriptine.   Her family plan: not actively planning now but may consider in the future.    (intermittent history) Her PRL level has been stable range from 41-44.  She underwent pituitary MRI in November 2017 showed 4 mm micro adenoma on the left.  Still has mild galactorrhea, but not disturbing her daily life.  Menstrual cycle used to be 30 days but now 28 days and irregular.  For the past 1 or 2 months her headache disappeared as well.  She has not taking any dopamine agonist.         Past Medical/Surgical History:  Past Medical History:   Diagnosis Date "     Prolactinoma (H)     since around      Past Surgical History:   Procedure Laterality Date      SECTION         Allergies:  No Known Allergies    Current Medications   No current outpatient medications on file.     No current facility-administered medications for this visit.      Facility-Administered Medications Ordered in Other Visits   Medication     diatrizoate meglumine (CYSTOGRAFIN) solution 300 mL       Family History:  maternal aunt: lupus, maternal uncle: IBS, paternal aunt: DM  Maternal cousin: crohn disease, maternal aunt: ulcerative colitis    Social History:  Social History     Tobacco Use     Smoking status: Never Smoker     Smokeless tobacco: Never Used   Substance Use Topics     Alcohol use: Not on file       ROS:  Full review of systems taken with the help of the intake sheet. Otherwise a complete 14 point review of systems was taken and is negative unless stated in the history above.    Physical Exam:   There were no vitals taken for this visit.     ENDO VITALS-UMP 3/23/2018 2017   Weight 135 lb 12.9 oz 131 lb 9.6 oz     General: well appearing, no acute distress, pleasant and conversant,   Mental Status/neuro: alert and oriented  Face: symmetrical, normal facial color, no acnes  Facial skin: no acnes, no hirsutism  Eyes: pink-redness+, PERRL, no proptosis or lid lag  Visual field: grossly intact  Occular motor: full motion  Neck: suppler, no lymphadenopahty  Thyroid: normal size and texture, no nodule palpable, no bruits  Heart: regular rhythm, S1S2, no murmur appreciated  Lung: clear to auscultation bilaterally  Abdomen: soft, NT/ND, no hepatomegaly  Legs: no swelling or edema      Labs :  I reviewed data from care everywhere (outside record) as well as from epic and extract and summarize pertinent data here.       Prolactin trends  2011   3/2012   2013   2014   3/2015   2015   2017  119.1        87.2       122.7     43.6      36.3         36.3       69.4          1/24/2008 10:39 3/20/2008 09:48 9/29/2017 11:53 10/31/2017 12:56   Prolactin 136 (H) 90 (H) 41 (H) 44 (H)   TSH 1.84  1.05    T4 Free 1.03  0.95    Ins Growth Factor 1   287 (H) 240     FSH 2.8 (4/2013)    TSH 2.36 (6/27/2017)    Am cortisol  5 (3/2012)    Lab Results   Component Value Date    CR 0.80 01/24/2008     No results found for: GLC  Lab Results   Component Value Date    TSH 1.84 01/24/2008     Lab Results   Component Value Date    T4 1.03 01/24/2008     Lab Results   Component Value Date    ESTROGEN 25 01/24/2008     Lab Results   Component Value Date    PROLACTIN 90 03/20/2008       MRI Brain: 3/9/2012 [facility]   CLINICAL HISTORY:  Increased prolactin levels.    Comparison:  None.  FINDINGS:  There are no intracranial masses, midline shift, hydrocephalus,   intracranial hemorrhage or acute ischemia.  Basal cisterns and   intracranial ventricles show normal size and configuration.  There are no   signal abnormalities or structural abnormalities in the brain.  There is   no abnormal contrast enhancement throughout.    Dedicated imaging of the sella turcica reveals homogeneous signal and   contrast enhancement within the pituitary gland.  No evidence for mass or   microadenoma.      MRI pituitary: 11/2017--I personally reviewed the original images and also went over the imaging with the patient and explained.  Agree below somebody of 4 mm left microadenoma.  Optic chiasm intact.      Impression: 4 mm left posterior adenohypophysis microadenoma without  mass effect. Will repeat next month PRL and IGF1.     I explained to the patient.                 Again, thank you for allowing me to participate in the care of your patient.        Sincerely,        Megan Lopez MD

## 2019-03-15 NOTE — PROGRESS NOTES
- Endocrinology follow up -    Reason for visit/consult:  Micro Prolactinoma    Primary care provider: Jacquelyn Marrufo      Assessment and Plan  A 38 year old female with long standing prolactinoma, without Crum, currently recurred symptoms of HA, galactorrhea,    # Prolactinoma  - PRL, TSH, free T4 today  Patient does not want on dopamine agonist treatment, and she does not have definite indications as well,  PRL mild elevation with stable levels, she is not planning fertility, mild galactorrhea not bother her QI, and also menstrual cycle regular.     # Mildly elevated IGF1 previously  - No signs of acromegaly, repeated one was normal range,   - IGF1 today as well    # Galactorrhea  - Due to Prolactinoma, but not bothering her daily life, will watch    # Pituitary tumor  4 mm on the left part of the sella in 10/2017. Will do 3 year follow up MRI in 2020.     # dry skin/constipation  We will do screening of TSH and free T4 today       - RTC with me in 1 year       I spent 30 minutes with this patient face to face and explained the conditions and plans (more than 50% of time was counseling/coordination of care, plan of observation of her current status of prolactinoma) . The patient understood and is satisfied with today's visit. Return to clinic with me in 1 year.         Megan Lopez MD  Staff Physician  Endocrinology and Metabolism  License: [id number]    Interval History: Patient has been doing well. Stable body weight, no HA, no blurry vision. Still has mild galactorrhea, but menstrual cycles regular. She also mentioned dry skin and constipations.   HPI: A 38 yo female here for the evaluation/transiton of care for her prolactinoma.   She was diagnosed prolactinoma around 15 years ago, the original diagnosis was made during her prolonged amenorrhea work up. She recalled her PRL was some 3 digits with small tumor by MRI (size unclear, we don't have record).  She was initially  "prescribed bromocriptine which she took around 2-3 years on and off without consistency. Then eventually she self discontinued for the next several years. Last imaging study was  MRI, which reported as no signs of recurrent tumor (no original images obtained).     Around , she was planning to conceive and went to endocrinologist and started on cabergoline, after a few months of cabergoline, she became pregnant and stopped cabergoline. Since then she has not had any dopamine agonists and she naturally conceived the second child in .     Her main concern today is her recent blurry vision with HA, and galactorrhea. She describes her HA like \"sinus and and whole head, skull pressing like pain\", frequency every 2 weeks, then currently better. She is recently sensivie to noise and smell, no photo sensivitiy.  A few weeks ago, she started to noticed bilateral galactorrhea as well.     Energy level: lower side, hair loss: a little more thatn normal, hair drier, ,  Body weight: no change, Of note, she had light headedness, dizziness, nauseous with both bromocriptine and cabergoline but more symptoms with bromocriptine.   Her family plan: not actively planning now but may consider in the future.    (intermittent history) Her PRL level has been stable range from 41-44.  She underwent pituitary MRI in 2017 showed 4 mm micro adenoma on the left.  Still has mild galactorrhea, but not disturbing her daily life.  Menstrual cycle used to be 30 days but now 28 days and irregular.  For the past 1 or 2 months her headache disappeared as well.  She has not taking any dopamine agonist.         Past Medical/Surgical History:  Past Medical History:   Diagnosis Date     Prolactinoma (H)     since around      Past Surgical History:   Procedure Laterality Date      SECTION         Allergies:  No Known Allergies    Current Medications   No current outpatient medications on file.     No current " facility-administered medications for this visit.      Facility-Administered Medications Ordered in Other Visits   Medication     diatrizoate meglumine (CYSTOGRAFIN) solution 300 mL       Family History:  maternal aunt: lupus, maternal uncle: IBS, paternal aunt: DM  Maternal cousin: crohn disease, maternal aunt: ulcerative colitis    Social History:  Social History     Tobacco Use     Smoking status: Never Smoker     Smokeless tobacco: Never Used   Substance Use Topics     Alcohol use: Not on file       ROS:  Full review of systems taken with the help of the intake sheet. Otherwise a complete 14 point review of systems was taken and is negative unless stated in the history above.    Physical Exam:   There were no vitals taken for this visit.     ENDO VITALS-UMP 3/23/2018 9/29/2017   Weight 135 lb 12.9 oz 131 lb 9.6 oz     General: well appearing, no acute distress, pleasant and conversant,   Mental Status/neuro: alert and oriented  Face: symmetrical, normal facial color, no acnes  Facial skin: no acnes, no hirsutism  Eyes: pink-redness+, PERRL, no proptosis or lid lag  Visual field: grossly intact  Occular motor: full motion  Neck: suppler, no lymphadenopahty  Thyroid: normal size and texture, no nodule palpable, no bruits  Heart: regular rhythm, S1S2, no murmur appreciated  Lung: clear to auscultation bilaterally  Abdomen: soft, NT/ND, no hepatomegaly  Legs: no swelling or edema      Labs :  I reviewed data from care everywhere (outside record) as well as from epic and extract and summarize pertinent data here.       Prolactin trends  11/2011   3/2012   4/2013   8/2014   3/2015   8/2015   6/27/2017  119.1        87.2       122.7     43.6      36.3         36.3       69.4         1/24/2008 10:39 3/20/2008 09:48 9/29/2017 11:53 10/31/2017 12:56   Prolactin 136 (H) 90 (H) 41 (H) 44 (H)   TSH 1.84  1.05    T4 Free 1.03  0.95    Ins Growth Factor 1   287 (H) 240     FSH 2.8 (4/2013)    TSH 2.36 (6/27/2017)    Am  cortisol  5 (3/2012)    Lab Results   Component Value Date    CR 0.80 01/24/2008     No results found for: GLC  Lab Results   Component Value Date    TSH 1.84 01/24/2008     Lab Results   Component Value Date    T4 1.03 01/24/2008     Lab Results   Component Value Date    ESTROGEN 25 01/24/2008     Lab Results   Component Value Date    PROLACTIN 90 03/20/2008       MRI Brain: 3/9/2012 at [facility]   CLINICAL HISTORY:  Increased prolactin levels.    Comparison:  None.  FINDINGS:  There are no intracranial masses, midline shift, hydrocephalus,   intracranial hemorrhage or acute ischemia.  Basal cisterns and   intracranial ventricles show normal size and configuration.  There are no   signal abnormalities or structural abnormalities in the brain.  There is   no abnormal contrast enhancement throughout.    Dedicated imaging of the sella turcica reveals homogeneous signal and   contrast enhancement within the pituitary gland.  No evidence for mass or   microadenoma.      MRI pituitary: 11/2017--I personally reviewed the original images and also went over the imaging with the patient and explained.  Agree below somebody of 4 mm left microadenoma.  Optic chiasm intact.      Impression: 4 mm left posterior adenohypophysis microadenoma without  mass effect. Will repeat next month PRL and IGF1.     I explained to the patient.

## 2019-03-19 LAB — IGF-I BLD-MCNC: 244 NG/ML (ref 80–277)

## 2019-11-08 ENCOUNTER — HEALTH MAINTENANCE LETTER (OUTPATIENT)
Age: 39
End: 2019-11-08

## 2020-02-23 ENCOUNTER — HEALTH MAINTENANCE LETTER (OUTPATIENT)
Age: 40
End: 2020-02-23

## 2020-04-22 DIAGNOSIS — D35.2 PROLACTINOMA (H): ICD-10-CM

## 2020-04-22 LAB
PROLACTIN SERPL-MCNC: 50 UG/L (ref 3–27)
T4 FREE SERPL-MCNC: 1.04 NG/DL (ref 0.76–1.46)
TSH SERPL DL<=0.005 MIU/L-ACNC: 1.92 MU/L (ref 0.4–4)

## 2020-04-22 PROCEDURE — 84439 ASSAY OF FREE THYROXINE: CPT | Performed by: INTERNAL MEDICINE

## 2020-04-22 PROCEDURE — 84146 ASSAY OF PROLACTIN: CPT | Performed by: INTERNAL MEDICINE

## 2020-04-22 PROCEDURE — 84443 ASSAY THYROID STIM HORMONE: CPT | Performed by: INTERNAL MEDICINE

## 2020-04-22 PROCEDURE — 36415 COLL VENOUS BLD VENIPUNCTURE: CPT | Performed by: INTERNAL MEDICINE

## 2020-04-24 ENCOUNTER — VIRTUAL VISIT (OUTPATIENT)
Dept: ENDOCRINOLOGY | Facility: CLINIC | Age: 40
End: 2020-04-24
Payer: COMMERCIAL

## 2020-04-24 DIAGNOSIS — D49.7 PITUITARY TUMOR: ICD-10-CM

## 2020-04-24 DIAGNOSIS — D35.2 PROLACTINOMA (H): Primary | ICD-10-CM

## 2020-04-24 PROCEDURE — 99214 OFFICE O/P EST MOD 30 MIN: CPT | Mod: GT | Performed by: INTERNAL MEDICINE

## 2020-04-24 NOTE — PROGRESS NOTES
"Aleta Shankar is a 39 year old female who is being evaluated via a billable video visit.      The patient has been notified of following:     \"This video visit will be conducted via a call between you and your physician/provider. We have found that certain health care needs can be provided without the need for an in-person physical exam.  This service lets us provide the care you need with a video conversation.  If a prescription is necessary we can send it directly to your pharmacy.  If lab work is needed we can place an order for that and you can then stop by our lab to have the test done at a later time.    Video visits are billed at different rates depending on your insurance coverage.  Please reach out to your insurance provider with any questions.    If during the course of the call the physician/provider feels a video visit is not appropriate, you will not be charged for this service.\"    Patient has given verbal consent for Video visit? Yes    How would you like to obtain your AVS? Priya    Patient would like the video invitation sent by: Send to e-mail at: [email or web address]    Will anyone else be joining your video visit? No        Video-Visit Details    Type of service:  Video Visit    Start: 04/24/2020 02:02 pm   Stop: 04/24/2020 02:22 pm     Originating Location (pt. Location): Home    Distant Location (provider location):  [facility]     Mode of Communication:  Video Conference via [facility]                                     - Endocrinology follow up -    Reason for visit/consult:  Micro Prolactinoma    Primary care provider: Jacquelyn Marrufo      Assessment and Plan  A 38 year old female with long standing prolactinoma, without Crum, currently recurred symptoms of HA, galactorrhea,    # Prolactinoma  PRL 50 stable. No fertility plan.   No galactorrhea, regular menstrual cycle  No HA    Continue to watch yearly without treatment      # Mildly elevated IGF1 " "previously  Normalized.     # Galactorrhea  resolved    # Pituitary tumor  4 mm on the left part of the sella in 10/2017.     - follow up MRI by the end of 2020          - RTC with me in 1 year       Megan Lopez MD  Staff Physician  Endocrinology and Metabolism  License: [id number]    Interval History as of 4/24/2020 : Patient has been doing well. Annual follow up, stable PRL 50. New event includes none .  Interval History: Patient has been doing well. Stable body weight, no HA, no blurry vision. Still has mild galactorrhea, but menstrual cycles regular. She also mentioned dry skin and constipations.   HPI: A 38 yo female here for the evaluation/transiton of care for her prolactinoma.   She was diagnosed prolactinoma around 15 years ago, the original diagnosis was made during her prolonged amenorrhea work up. She recalled her PRL was some 3 digits with small tumor by MRI (size unclear, we don't have record).  She was initially prescribed bromocriptine which she took around 2-3 years on and off without consistency. Then eventually she self discontinued for the next several years. Last imaging study was 2012 MRI, which reported as no signs of recurrent tumor (no original images obtained).     Around 2012, she was planning to conceive and went to endocrinologist and started on cabergoline, after a few months of cabergoline, she became pregnant and stopped cabergoline. Since then she has not had any dopamine agonists and she naturally conceived the second child in 2015.     Her main concern today is her recent blurry vision with HA, and galactorrhea. She describes her HA like \"sinus and and whole head, skull pressing like pain\", frequency every 2 weeks, then currently better. She is recently sensivie to noise and smell, no photo sensivitiy.  A few weeks ago, she started to noticed bilateral galactorrhea as well.     Energy level: lower side, hair loss: a little more thatn normal, hair drier, ,  Body weight: no " change, Of note, she had light headedness, dizziness, nauseous with both bromocriptine and cabergoline but more symptoms with bromocriptine.   Her family plan: not actively planning now but may consider in the future.    (intermittent history) Her PRL level has been stable range from 41-44.  She underwent pituitary MRI in 2017 showed 4 mm micro adenoma on the left.  Still has mild galactorrhea, but not disturbing her daily life.  Menstrual cycle used to be 30 days but now 28 days and irregular.  For the past 1 or 2 months her headache disappeared as well.  She has not taking any dopamine agonist.         Past Medical/Surgical History:  Past Medical History:   Diagnosis Date     Prolactinoma (H)     since around      Past Surgical History:   Procedure Laterality Date      SECTION         Allergies:  No Known Allergies    Current Medications   No current outpatient medications on file.     No current facility-administered medications for this visit.      Facility-Administered Medications Ordered in Other Visits   Medication     diatrizoate meglumine (CYSTOGRAFIN) solution 300 mL       Family History:  maternal aunt: lupus, maternal uncle: IBS, paternal aunt: DM  Maternal cousin: crohn disease, maternal aunt: ulcerative colitis    Social History:  Social History     Tobacco Use     Smoking status: Never Smoker     Smokeless tobacco: Never Used   Substance Use Topics     Alcohol use: Not on file     Comment: rare       ROS:  Full review of systems taken with the help of the intake sheet. Otherwise a complete 14 point review of systems was taken and is negative unless stated in the history above.    Physical Exam:   There were no vitals taken for this visit.     ENDO VITALS-UMP 3/23/2018 2017   Weight 135 lb 12.9 oz 131 lb 9.6 oz     General: well appearing, no acute distress, pleasant and conversant,   Mental Status/neuro: alert and oriented  Face: symmetrical, normal facial color, no  acnes  Facial skin: no acnes, no hirsutism  Respiratory: NAD      Labs :  I reviewed data from care everywhere (outside record) as well as from epic and extract and summarize pertinent data here.       Prolactin trends  11/2011   3/2012   4/2013   8/2014   3/2015   8/2015   6/27/2017  119.1        87.2       122.7     43.6      36.3         36.3       69.4         1/24/2008 10:39 3/20/2008 09:48 9/29/2017 11:53 10/31/2017 12:56   Prolactin 136 (H) 90 (H) 41 (H) 44 (H)   TSH 1.84  1.05    T4 Free 1.03  0.95    Ins Growth Factor 1   287 (H) 240     FSH 2.8 (4/2013)    TSH 2.36 (6/27/2017)    Am cortisol  5 (3/2012)    Lab Results   Component Value Date    CR 0.80 01/24/2008     No results found for: GLC  Lab Results   Component Value Date    TSH 1.84 01/24/2008     Lab Results   Component Value Date    T4 1.03 01/24/2008     Lab Results   Component Value Date    ESTROGEN 25 01/24/2008     Lab Results   Component Value Date    PROLACTIN 90 03/20/2008       MRI Brain: 3/9/2012 at [facility]   CLINICAL HISTORY:  Increased prolactin levels.    Comparison:  None.  FINDINGS:  There are no intracranial masses, midline shift, hydrocephalus,   intracranial hemorrhage or acute ischemia.  Basal cisterns and   intracranial ventricles show normal size and configuration.  There are no   signal abnormalities or structural abnormalities in the brain.  There is   no abnormal contrast enhancement throughout.    Dedicated imaging of the sella turcica reveals homogeneous signal and   contrast enhancement within the pituitary gland.  No evidence for mass or   microadenoma.      MRI pituitary: 11/2017--I personally reviewed the original images and also went over the imaging with the patient and explained.  Agree below somebody of 4 mm left microadenoma.  Optic chiasm intact.      Impression: 4 mm left posterior adenohypophysis microadenoma without  mass effect. Will repeat next month PRL and IGF1.     I explained to the  patient.

## 2020-12-06 ENCOUNTER — HEALTH MAINTENANCE LETTER (OUTPATIENT)
Age: 40
End: 2020-12-06

## 2021-01-21 ENCOUNTER — TELEPHONE (OUTPATIENT)
Dept: ENDOCRINOLOGY | Facility: CLINIC | Age: 41
End: 2021-01-21

## 2021-01-21 NOTE — TELEPHONE ENCOUNTER
Left message for patient to return clinic call regarding scheduling. Patient needs a MRI completed prior to 3/25/21 and an appointment for 1 year Pituitary tumor follow up with Dr Lopez around 4/27/21. Number to clinic given, please assist in scheduling once patient returns clinic call.     Call Center OKAY TO SCHEDULE.    Thanks,   Franci Vinson   Lead  [employer] [place]

## 2021-02-02 ENCOUNTER — MYC MEDICAL ADVICE (OUTPATIENT)
Dept: PEDIATRICS | Facility: CLINIC | Age: 41
End: 2021-02-02

## 2021-02-09 NOTE — TELEPHONE ENCOUNTER
3rd attempt to schedule visit with John in April, left voicemail.  [employer] message unread.  MRI is already scheduled.    Franci Vinson   Lead  [facility] [place]

## 2021-02-24 ENCOUNTER — ANCILLARY PROCEDURE (OUTPATIENT)
Dept: MRI IMAGING | Facility: CLINIC | Age: 41
End: 2021-02-24
Attending: INTERNAL MEDICINE
Payer: COMMERCIAL

## 2021-02-24 DIAGNOSIS — D35.2 PROLACTINOMA (H): ICD-10-CM

## 2021-02-24 PROCEDURE — 70543 MRI ORBT/FAC/NCK W/O &W/DYE: CPT | Performed by: RADIOLOGY

## 2021-02-24 PROCEDURE — A9585 GADOBUTROL INJECTION: HCPCS | Mod: JW | Performed by: RADIOLOGY

## 2021-02-24 RX ORDER — GADOBUTROL 604.72 MG/ML
7.5 INJECTION INTRAVENOUS ONCE
Status: COMPLETED | OUTPATIENT
Start: 2021-02-24 | End: 2021-02-24

## 2021-02-24 RX ADMIN — GADOBUTROL 6 ML: 604.72 INJECTION INTRAVENOUS at 08:45

## 2021-03-05 ENCOUNTER — VIRTUAL VISIT (OUTPATIENT)
Dept: ENDOCRINOLOGY | Facility: CLINIC | Age: 41
End: 2021-03-05
Payer: COMMERCIAL

## 2021-03-05 DIAGNOSIS — D35.2 PITUITARY ADENOMA (H): ICD-10-CM

## 2021-03-05 DIAGNOSIS — D35.2 PROLACTINOMA (H): Primary | ICD-10-CM

## 2021-03-05 PROCEDURE — 99214 OFFICE O/P EST MOD 30 MIN: CPT | Mod: 95 | Performed by: INTERNAL MEDICINE

## 2021-03-05 NOTE — PROGRESS NOTES
Aleta is a 40 year old who is being evaluated via a billable telephone visit.      What phone number would you like to be contacted at? [phone] (home)   How would you like to obtain your AVS? Priya Yang CMA  Adult Endocrinology  [facility]        Video-Visit Details    Type of service:  Video Visit    Start video 1:15  pm   Stop: 1:45  pm     Originating Location (pt. Location): Home    Distant Location (provider location):  [facility]     Mode of Communication:  Video Doximity                                     - Endocrinology follow up -    Reason for visit/consult:  Micro Prolactinoma    Primary care provider: Jacquelyn Marrufo      Assessment and Plan  A 40 year old female with long standing prolactinoma, without Crum, currently recurred symptoms of HA, galactorrhea,    # Prolactinoma  PRL has been stable over 4 years.  No fertility plan.   No galactorrhea, regular menstrual cycle  No HA    - PRL by summer 2021    Continue to watch without treatment, offered extend of follow up in 2 years.       # Mildly elevated IGF1 previously  Normalized.     # Galactorrhea  resolved    # Pituitary tumor - stable micro lesion  4 mm on the left part of the sella in 10/2017, repeated MRI 2/2021 reviewed and explained, no change.           - RTC with me in 1 year       Megan Lopez MD  Staff Physician  Endocrinology and Metabolism  License: [id number]    Interval History as of 3/5/2021 : Patient has been doing well.   . New event includes  .none  Interval History as of 4/24/2020 : Patient has been doing well. Annual follow up, stable PRL 50. New event includes none .  Interval History: Patient has been doing well. Stable body weight, no HA, no blurry vision. Still has mild galactorrhea, but menstrual cycles regular. She also mentioned dry skin and constipations.   HPI: A 38 yo female here for the evaluation/transiton of care for her prolactinoma.   She was  "diagnosed prolactinoma around 15 years ago, the original diagnosis was made during her prolonged amenorrhea work up. She recalled her PRL was some 3 digits with small tumor by MRI (size unclear, we don't have record).  She was initially prescribed bromocriptine which she took around 2-3 years on and off without consistency. Then eventually she self discontinued for the next several years. Last imaging study was 2012 MRI, which reported as no signs of recurrent tumor (no original images obtained).     Around 2012, she was planning to conceive and went to endocrinologist and started on cabergoline, after a few months of cabergoline, she became pregnant and stopped cabergoline. Since then she has not had any dopamine agonists and she naturally conceived the second child in 2015.     Her main concern today is her recent blurry vision with HA, and galactorrhea. She describes her HA like \"sinus and and whole head, skull pressing like pain\", frequency every 2 weeks, then currently better. She is recently sensivie to noise and smell, no photo sensivitiy.  A few weeks ago, she started to noticed bilateral galactorrhea as well.     Energy level: lower side, hair loss: a little more thatn normal, hair drier, ,  Body weight: no change, Of note, she had light headedness, dizziness, nauseous with both bromocriptine and cabergoline but more symptoms with bromocriptine.   Her family plan: not actively planning now but may consider in the future.    (intermittent history) Her PRL level has been stable range from 41-44.  She underwent pituitary MRI in November 2017 showed 4 mm micro adenoma on the left.  Still has mild galactorrhea, but not disturbing her daily life.  Menstrual cycle used to be 30 days but now 28 days and irregular.  For the past 1 or 2 months her headache disappeared as well.  She has not taking any dopamine agonist.         Past Medical/Surgical History:  Past Medical History:   Diagnosis Date     " Prolactinoma (H)     since around      Past Surgical History:   Procedure Laterality Date      SECTION         Allergies:  No Known Allergies    Current Medications   Current Outpatient Medications   Medication     Calcium Citrate-Vitamin D (CITRACAL + D PO)     Ferrous Sulfate (IRON PO)     MAGNESIUM CITRATE PO     Omega-3 Fatty Acids (FISH OIL PO)     Prenatal Vit-Fe Fumarate-FA (PRENATAL VITAMIN PO)     VITAMIN E PO     No current facility-administered medications for this visit.      Facility-Administered Medications Ordered in Other Visits   Medication     diatrizoate meglumine (CYSTOGRAFIN) solution 300 mL       Family History:  maternal aunt: lupus, maternal uncle: IBS, paternal aunt: DM  Maternal cousin: crohn disease, maternal aunt: ulcerative colitis    Social History:  Social History     Tobacco Use     Smoking status: Never Smoker     Smokeless tobacco: Never Used   Substance Use Topics     Alcohol use: Not on file     Comment: rare       ROS:  Full review of systems taken with the help of the intake sheet. Otherwise a complete 14 point review of systems was taken and is negative unless stated in the history above.    Physical Exam:   There were no vitals taken for this visit.     General: well appearing, no acute distress, pleasant and conversant,   Mental Status/neuro: alert and oriented  Face: symmetrical, normal facial color, no acnes  Facial skin: no acnes, no hirsutism  Respiratory: NAD      Labs :  I reviewed data from care everywhere (outside record) as well as from epic and extract and summarize pertinent data here.       Prolactin trends  2011   3/2012   2013   2014   3/2015   2015   2017  119.1        87.2       122.7     43.6      36.3         36.3       69.4         2008 10:39 3/20/2008 09:48 2017 11:53 10/31/2017 12:56   Prolactin 136 (H) 90 (H) 41 (H) 44 (H)   TSH 1.84  1.05    T4 Free 1.03  0.95    Ins Growth Factor 1   287 (H) 240     FSH 2.8  (4/2013)    TSH 2.36 (6/27/2017)    Am cortisol  5 (3/2012)    Lab Results   Component Value Date    CR 0.80 01/24/2008     No results found for: GLC  Lab Results   Component Value Date    TSH 1.84 01/24/2008     Lab Results   Component Value Date    T4 1.03 01/24/2008     Lab Results   Component Value Date    ESTROGEN 25 01/24/2008     Lab Results   Component Value Date    PROLACTIN 90 03/20/2008       MRI Brain: 3/9/2012 at [facility]   CLINICAL HISTORY:  Increased prolactin levels.    Comparison:  None.  FINDINGS:  There are no intracranial masses, midline shift, hydrocephalus,   intracranial hemorrhage or acute ischemia.  Basal cisterns and   intracranial ventricles show normal size and configuration.  There are no   signal abnormalities or structural abnormalities in the brain.  There is   no abnormal contrast enhancement throughout.    Dedicated imaging of the sella turcica reveals homogeneous signal and   contrast enhancement within the pituitary gland.  No evidence for mass or   microadenoma.    MRI pituitary: 2/2021--I personally reviewed the original images and also went over the imaging with the patient and explained.           MRI pituitary: 11/2017--I personally reviewed the original images and also went over the imaging with the patient and explained.  Agree below somebody of 4 mm left microadenoma.  Optic chiasm intact.      Impression: 4 mm left posterior adenohypophysis microadenoma without  mass effect. Will repeat next month PRL and IGF1.     I explained to the patient.

## 2021-03-05 NOTE — LETTER
3/5/2021         RE: Aleta Shankar  [address] [place] [address]        Dear Colleague,    Thank you for referring your patient, Aleta Shankar, to the [facility]. Please see a copy of my visit note below.    Aleta is a 40 year old who is being evaluated via a billable telephone visit.      What phone number would you like to be contacted at? [phone] (home)   How would you like to obtain your AVS? Priya Yang CMA  Adult Endocrinology  [facility]        Video-Visit Details    Type of service:  Video Visit    Start video 1:15  pm   Stop: 1:45  pm     Originating Location (pt. Location): Home    Distant Location (provider location):  [facility]     Mode of Communication:  Video Doximity                                     - Endocrinology follow up -    Reason for visit/consult:  Micro Prolactinoma    Primary care provider: Jacquelyn Marrufo      Assessment and Plan  A 40 year old female with long standing prolactinoma, without Crum, currently recurred symptoms of HA, galactorrhea,    # Prolactinoma  PRL has been stable over 4 years.  No fertility plan.   No galactorrhea, regular menstrual cycle  No HA    - PRL by summer 2021    Continue to watch without treatment, offered extend of follow up in 2 years.       # Mildly elevated IGF1 previously  Normalized.     # Galactorrhea  resolved    # Pituitary tumor - stable micro lesion  4 mm on the left part of the sella in 10/2017, repeated MRI 2/2021 reviewed and explained, no change.           - RTC with me in 1 year       Megan Lopez MD  Staff Physician  Endocrinology and Metabolism  License: [id number]    Interval History as of 3/5/2021 : Patient has been doing well.   . New event includes  .none  Interval History as of 4/24/2020 : Patient has been doing well. Annual follow up, stable PRL 50. New event includes none .  Interval History: Patient has been  "doing well. Stable body weight, no HA, no blurry vision. Still has mild galactorrhea, but menstrual cycles regular. She also mentioned dry skin and constipations.   HPI: A 36 yo female here for the evaluation/transiton of care for her prolactinoma.   She was diagnosed prolactinoma around 15 years ago, the original diagnosis was made during her prolonged amenorrhea work up. She recalled her PRL was some 3 digits with small tumor by MRI (size unclear, we don't have record).  She was initially prescribed bromocriptine which she took around 2-3 years on and off without consistency. Then eventually she self discontinued for the next several years. Last imaging study was 2012 MRI, which reported as no signs of recurrent tumor (no original images obtained).     Around 2012, she was planning to conceive and went to endocrinologist and started on cabergoline, after a few months of cabergoline, she became pregnant and stopped cabergoline. Since then she has not had any dopamine agonists and she naturally conceived the second child in 2015.     Her main concern today is her recent blurry vision with HA, and galactorrhea. She describes her HA like \"sinus and and whole head, skull pressing like pain\", frequency every 2 weeks, then currently better. She is recently sensivie to noise and smell, no photo sensivitiy.  A few weeks ago, she started to noticed bilateral galactorrhea as well.     Energy level: lower side, hair loss: a little more thatn normal, hair drier, ,  Body weight: no change, Of note, she had light headedness, dizziness, nauseous with both bromocriptine and cabergoline but more symptoms with bromocriptine.   Her family plan: not actively planning now but may consider in the future.    (intermittent history) Her PRL level has been stable range from 41-44.  She underwent pituitary MRI in November 2017 showed 4 mm micro adenoma on the left.  Still has mild galactorrhea, but not disturbing her daily life.  " Menstrual cycle used to be 30 days but now 28 days and irregular.  For the past 1 or 2 months her headache disappeared as well.  She has not taking any dopamine agonist.         Past Medical/Surgical History:  Past Medical History:   Diagnosis Date     Prolactinoma (H)     since around      Past Surgical History:   Procedure Laterality Date      SECTION         Allergies:  No Known Allergies    Current Medications   Current Outpatient Medications   Medication     Calcium Citrate-Vitamin D (CITRACAL + D PO)     Ferrous Sulfate (IRON PO)     MAGNESIUM CITRATE PO     Omega-3 Fatty Acids (FISH OIL PO)     Prenatal Vit-Fe Fumarate-FA (PRENATAL VITAMIN PO)     VITAMIN E PO     No current facility-administered medications for this visit.      Facility-Administered Medications Ordered in Other Visits   Medication     diatrizoate meglumine (CYSTOGRAFIN) solution 300 mL       Family History:  maternal aunt: lupus, maternal uncle: IBS, paternal aunt: DM  Maternal cousin: crohn disease, maternal aunt: ulcerative colitis    Social History:  Social History     Tobacco Use     Smoking status: Never Smoker     Smokeless tobacco: Never Used   Substance Use Topics     Alcohol use: Not on file     Comment: rare       ROS:  Full review of systems taken with the help of the intake sheet. Otherwise a complete 14 point review of systems was taken and is negative unless stated in the history above.    Physical Exam:   There were no vitals taken for this visit.     General: well appearing, no acute distress, pleasant and conversant,   Mental Status/neuro: alert and oriented  Face: symmetrical, normal facial color, no acnes  Facial skin: no acnes, no hirsutism  Respiratory: NAD      Labs :  I reviewed data from care everywhere (outside record) as well as from epic and extract and summarize pertinent data here.       Prolactin trends  2011   3/2012   2013   2014   3/2015   2015   2017  119.1        87.2       122.7      43.6      36.3         36.3       69.4         1/24/2008 10:39 3/20/2008 09:48 9/29/2017 11:53 10/31/2017 12:56   Prolactin 136 (H) 90 (H) 41 (H) 44 (H)   TSH 1.84  1.05    T4 Free 1.03  0.95    Ins Growth Factor 1   287 (H) 240     FSH 2.8 (4/2013)    TSH 2.36 (6/27/2017)    Am cortisol  5 (3/2012)    Lab Results   Component Value Date    CR 0.80 01/24/2008     No results found for: GLC  Lab Results   Component Value Date    TSH 1.84 01/24/2008     Lab Results   Component Value Date    T4 1.03 01/24/2008     Lab Results   Component Value Date    ESTROGEN 25 01/24/2008     Lab Results   Component Value Date    PROLACTIN 90 03/20/2008       MRI Brain: 3/9/2012 at [facility]   CLINICAL HISTORY:  Increased prolactin levels.    Comparison:  None.  FINDINGS:  There are no intracranial masses, midline shift, hydrocephalus,   intracranial hemorrhage or acute ischemia.  Basal cisterns and   intracranial ventricles show normal size and configuration.  There are no   signal abnormalities or structural abnormalities in the brain.  There is   no abnormal contrast enhancement throughout.    Dedicated imaging of the sella turcica reveals homogeneous signal and   contrast enhancement within the pituitary gland.  No evidence for mass or   microadenoma.    MRI pituitary: 2/2021--I personally reviewed the original images and also went over the imaging with the patient and explained.           MRI pituitary: 11/2017--I personally reviewed the original images and also went over the imaging with the patient and explained.  Agree below somebody of 4 mm left microadenoma.  Optic chiasm intact.      Impression: 4 mm left posterior adenohypophysis microadenoma without  mass effect. Will repeat next month PRL and IGF1.     I explained to the patient.                             Again, thank you for allowing me to participate in the care of your patient.        Sincerely,        Megan Lopez MD

## 2021-03-05 NOTE — RESULT ENCOUNTER NOTE
Dear Aleta     Here is your results. Your pituitary looks good without medications.     Please call nursing line, if you are [facility] [place] at [phone], if you are [facility] patient at [phone],  if you have any questions.    Please take care  Megan oLpez MD

## 2021-09-25 ENCOUNTER — HEALTH MAINTENANCE LETTER (OUTPATIENT)
Age: 41
End: 2021-09-25

## 2022-01-15 ENCOUNTER — HEALTH MAINTENANCE LETTER (OUTPATIENT)
Age: 42
End: 2022-01-15

## 2022-03-15 ENCOUNTER — TELEPHONE (OUTPATIENT)
Dept: ENDOCRINOLOGY | Facility: CLINIC | Age: 42
End: 2022-03-15
Payer: COMMERCIAL

## 2022-03-15 ENCOUNTER — MYC MEDICAL ADVICE (OUTPATIENT)
Dept: ENDOCRINOLOGY | Facility: CLINIC | Age: 42
End: 2022-03-15
Payer: COMMERCIAL

## 2022-04-18 ENCOUNTER — TRANSCRIBE ORDERS (OUTPATIENT)
Dept: CARDIOLOGY | Facility: CLINIC | Age: 42
End: 2022-04-18
Payer: COMMERCIAL

## 2022-05-04 ENCOUNTER — TELEPHONE (OUTPATIENT)
Dept: CARDIOLOGY | Facility: CLINIC | Age: 42
End: 2022-05-04
Payer: COMMERCIAL

## 2022-05-05 NOTE — TELEPHONE ENCOUNTER
Confirmed 5/19 @ 10:00 AM appointment with patient. Patient and I discussed appointment details, arrival time and location

## 2022-05-19 ENCOUNTER — HOSPITAL ENCOUNTER (OUTPATIENT)
Dept: CARDIOLOGY | Facility: CLINIC | Age: 42
Discharge: HOME | End: 2022-05-19
Attending: PHYSICIAN ASSISTANT
Payer: COMMERCIAL

## 2022-05-19 ENCOUNTER — OFFICE VISIT (OUTPATIENT)
Dept: CARDIOLOGY | Facility: CLINIC | Age: 42
End: 2022-05-19

## 2022-05-19 ENCOUNTER — HOSPITAL ENCOUNTER (OUTPATIENT)
Dept: CARDIOLOGY | Facility: CLINIC | Age: 42
Discharge: HOME | End: 2022-05-19
Payer: COMMERCIAL

## 2022-05-19 DIAGNOSIS — O30.002 TWIN GESTATION IN SECOND TRIMESTER: ICD-10-CM

## 2022-05-19 DIAGNOSIS — O30.019: ICD-10-CM

## 2022-05-19 DIAGNOSIS — O30.012: Primary | ICD-10-CM

## 2022-05-19 PROCEDURE — 93325 DOPPLER ECHO COLOR FLOW MAPG: CPT | Mod: 26 | Performed by: PEDIATRICS

## 2022-05-19 PROCEDURE — 93325 DOPPLER ECHO COLOR FLOW MAPG: CPT

## 2022-05-19 PROCEDURE — 76825 ECHO EXAM OF FETAL HEART: CPT | Mod: 26 | Performed by: PEDIATRICS

## 2022-05-19 PROCEDURE — 76827 ECHO EXAM OF FETAL HEART: CPT | Mod: 26 | Performed by: PEDIATRICS

## 2022-05-19 PROCEDURE — 99203 OFFICE O/P NEW LOW 30 MIN: CPT | Mod: 25 | Performed by: PEDIATRICS

## 2022-05-19 NOTE — PROGRESS NOTES
Fetal Cardiology Consultation    Patient:  Aleta Shankar MRN:  [id number]   YOB: 1980 Age:  41 year old   Date of Visit:  5/19/2022 PCP:  Jacquelyn Marrufo   IBAN: 9/11/22 EGA: 23+4 weeks     Dear Dr. Rivera:    I had the pleasure of seeing Aleta Shankar at the [facility] in [place] on 5/19/2022 in consultation for fetal echocardiography results. She presented today accompanied by her partner. As you know, she is a 41 year old female with multiple gestation (monochorionic monoamniotic twins).    In both twins, the fetal echocardiogram was normal. Normal fetal cardiac anatomy. Normal right and left ventricular size and function without hypertrophy. No evidence of diastolic dysfunction. No pericardial effusion. No arrhythmia.     I reviewed and interpreted the fetal echocardiogram today. I discussed the normal results with Ms. Shankar and her partner. While these results are normal, it is important to note that fetal echocardiography cannot exclude small atrial or ventricular septal defects, persistent ductus arteriosus, mild coarctation of the aorta, partial anomalous pulmonary venous return, minor anatomic valve anomalies, or coronary artery anomalies.     Thank you for allowing me to participate in Ms. Shankar's care. Please don't hesitate to contact me or the Fetal Cardiology team at [facility] with any questions or concerns.     I spent a total of 15 minutes on the date of the encounter doing chart review, patient history, documentation, counseling, and coordinating care.    Brady Zambrano MD  Pediatric Cardiology  [facility]  Phone [phone]    Review of prior external note(s) from - Outside records from [facility]  Review of the result(s) of each unique test - fetal echocardiogram x2

## 2023-04-22 ENCOUNTER — HEALTH MAINTENANCE LETTER (OUTPATIENT)
Age: 43
End: 2023-04-22

## 2023-08-09 ENCOUNTER — TELEPHONE (OUTPATIENT)
Dept: ENDOCRINOLOGY | Facility: CLINIC | Age: 43
End: 2023-08-09
Payer: COMMERCIAL

## 2023-08-09 NOTE — TELEPHONE ENCOUNTER
[facility] Call Center    Phone Message    May a detailed message be left on voicemail: yes     Reason for Call: Other: Patient would like to  speak with nurse to see what labs she is due for and if she needs another MRI before scheduling follow up ?     Action Taken: Other: ENDO    Travel Screening: Not Applicable

## 2023-08-09 NOTE — TELEPHONE ENCOUNTER
Last seen 3/2021 with return to be in 2 years. No follow up scheduled as of yet so no imaging or lab orders . If she did  order any they would need to be done  one week before the follow up and wouldn't take the place of visit being needed. Laura Mcnally, RN on 8/9/2023 at 1:19 PM

## 2024-02-10 ENCOUNTER — HEALTH MAINTENANCE LETTER (OUTPATIENT)
Age: 44
End: 2024-02-10

## 2024-06-29 ENCOUNTER — HEALTH MAINTENANCE LETTER (OUTPATIENT)
Age: 44
End: 2024-06-29